# Patient Record
Sex: FEMALE | Race: WHITE
[De-identification: names, ages, dates, MRNs, and addresses within clinical notes are randomized per-mention and may not be internally consistent; named-entity substitution may affect disease eponyms.]

---

## 2018-03-20 ENCOUNTER — HOSPITAL ENCOUNTER (OUTPATIENT)
Dept: HOSPITAL 47 - WWCWWP | Age: 73
Discharge: HOME | End: 2018-03-20
Payer: MEDICARE

## 2018-03-20 VITALS — TEMPERATURE: 97.8 F | HEART RATE: 78 BPM | DIASTOLIC BLOOD PRESSURE: 61 MMHG | SYSTOLIC BLOOD PRESSURE: 135 MMHG

## 2018-03-20 VITALS — BODY MASS INDEX: 24.9 KG/M2

## 2018-03-20 DIAGNOSIS — Z12.31: Primary | ICD-10-CM

## 2018-03-20 PROCEDURE — 77067 SCR MAMMO BI INCL CAD: CPT

## 2018-03-20 PROCEDURE — 77063 BREAST TOMOSYNTHESIS BI: CPT

## 2018-03-20 NOTE — P.HPOB
History of Present Illness


H&P Date: 03/20/18


Chief Complaint: Patient is here for her routine gynecologic exam and mammogram.





This is a 72-year-old G3 PIII with an LMP of 1995.  The patient denies any 

postmenopausal bleeding.  The patient has been experiencing abdominal bloating 

and slight distention over the past year.  She is otherwise without gynecologic 

complaints.





Review of Systems


Patient is gained about 3 pounds over the last year.


She denies respiratory, cardiac and G.I. problems.  She denies maltreatment or 

problems with falling.  : she has experienced occasional leakage especially 

with coughing and sneezing.  She is not interested in any surgical correction 

at this time.





Past Medical History


Past Medical History: Asthma (Not requiring medication.), Hyperlipidemia, 

Hypertension, Musculoskeletal Disorder (Arthritis and chronic back problems.)


Additional Past Medical History / Comment(s): History of rosacea.


Past Surgical History: Cholecystectomy, Joint Replacement (Right hip replaced 

in 2012.)


Additional Past Surgical History / Comment(s): Multiple colonoscopies in the 

past in the most recent was in 2017.


Smoking Status: Never smoker


Past Alcohol Use History: None Reported


Past Drug Use History: None Reported


Additional History: She has been  since 1964 and is retired.





Medications and Allergies


 Home Medications











 Medication  Instructions  Recorded  Confirmed  Type


 


Biotin 10,000 mcg PO DAILY 03/16/18 03/20/18 History


 


Calcium Carbonate [Calcium] 600 mg PO 03/16/18  History


 


Lisinopril [Zestril] 5 mg PO DAILY 03/16/18 03/20/18 History


 


Nitrofurantoin Monohyd/M-Cryst 100 mg PO Q12HR 03/16/18 03/20/18 History





[Macrobid]    


 


Simvastatin [Zocor] 20 mg PO DAILY 03/16/18 03/20/18 History


 


Cholecalciferol (Vitamin D3) 2,000 unit PO DAILY 03/20/18 03/20/18 History





[Vitamin D3]    


 


Cyanocobalamin (Vitamin B-12) 2,000 mcg PO DAILY 03/20/18 03/20/18 History





[Vitamin B-12]    


 


Doxycycline [Vibramycin] 50 mg PO Q12HR 03/20/18 03/20/18 History


 


Glucosam/Yaya-Msm1/C/Geraldo/Bosw 1 each PO 03/20/18  History





[Glucosamine-Chondroitin Tablet]    


 


Omega-3 Fatty Acids [Omega-3] 1,000 mg PO DAILY 03/20/18 03/20/18 History














Exam





- Vital Signs


Vital signs: 


 Vital Signs











  Temp Pulse BP


 


 03/20/18 13:48  97.8 F  78  135/61








 Intake and Output











 03/19/18 03/20/18 03/20/18





 22:59 06:59 14:59


 


Other:   


 


  Weight   74.389 kg














Height 5'8", BMI 25.





This is a well-developed well-nourished white female who is alert and oriented 

times 3 in no acute distress.


HEENT: Within normal limits.


NECK: Supple without mass or thyromegaly.


CHEST AND LUNGS: Clear to auscultation.


HEART: Regular rate and rhythm.


BREASTS: Are without mass or discharge.


AXILLARY EXAM: Negative for adenopathy.


BACK: Negative for CVA tenderness.


ABDOMEN: Soft, nontender, without palpable masses.


PELVIC EXAM: Normal external genitalia with mild atrophy. Cervix and vagina 

appear normal with mild atrophy. There is no unusual discharge.  There is no 

evidence of prolapse at rest.  With cough and Valsalva there is mild urethral 

mobility without urinary leakage demonstrated.  The uterus is midposition, 

nongravid size and nontender. There are no palpable adnexal masses or 

tenderness.


RECTAL EXAM: recto vaginal exam is negative for mass or tenderness and is 

negative for occult blood.


EXTREMITIES: Nontender.





IMPRESSION: 


1.  72-year-old menopausal female with normal gynecologic exam.


2.  Mild stress urinary incontinence without significant cystocele.  The 

patient is declining surgical intervention at this time.


3.  Subjective sensation of chronic abdominal bloating without any significant 

physical findings at this time.





PLAN: 


1.  Pap smear was deferred since she had one done less than 2 years ago which 

was normal.


2.  Self breast examination was discussed.


3.  Screening mammogram will be done today.


4.  Pelvic ultrasound will be scheduled to evaluate the abdominal bloating.


5.  Patient did receive a flu shot this past fall.


6.  Kegel exercises were recommended.  She will call if she is having worsening 

symptoms.


7.  She will return in one year.

## 2018-03-21 ENCOUNTER — HOSPITAL ENCOUNTER (OUTPATIENT)
Dept: HOSPITAL 47 - RADUSWWP | Age: 73
Discharge: HOME | End: 2018-03-21
Payer: MEDICARE

## 2018-03-21 DIAGNOSIS — R14.0: Primary | ICD-10-CM

## 2018-03-21 PROCEDURE — 76830 TRANSVAGINAL US NON-OB: CPT

## 2018-03-21 PROCEDURE — 76856 US EXAM PELVIC COMPLETE: CPT

## 2018-03-22 NOTE — MM
Reason for exam: screening  (asymptomatic).

Last mammogram was performed 1 year and 3 months ago.



History:

Patient is postmenopausal.

Benign cyst aspiration of the right breast.

Took estrogen for 10 years.  Took progesterone for 10 years.



Physical Findings:

A clinical breast exam by your physician is recommended on an annual basis and 

results should be correlated with mammographic findings.



MG 3D Screening Mammo W/Cad

Bilateral CC and MLO view(s) were taken.

Prior study comparison: December 20, 2016, bilateral MG 3d screening mammo w/cad. 

November 17, 2015, bilateral MG 3d screening mammo w/cad.

The breast tissue is heterogeneously dense. This may lower the sensitivity of 

mammography.  No suspicious abnormality.  No significant changes when compared 

with prior studies.





ASSESSMENT: Negative, BI-RAD 1



RECOMMENDATION:

Routine screening mammogram of both breasts in 1 year.

## 2018-03-22 NOTE — US
EXAMINATION TYPE: US pelvis complete transvag

 

DATE OF EXAM: 3/21/2018

 

COMPARISON: NONE

 

CLINICAL HISTORY: R14.0 ABD BLOATING.

 

TECHNIQUE:  Transvaginal (TV) and Transabdominal (TA) .  Transabdominal sonographic images of the pel
vis were acquired.  Transvaginal sonographic images were medically necessary to better assess the fol
lowing anatomy: uterus and ovaries.

 

Date of LMP:  1995

 

EXAM MEASUREMENTS:

 

Uterus:  6.1 x 3.0 x 4.5 cm

Endometrial Stripe: 0.3 cm

Right Ovary:  not identified on today's exam cm

Left Ovary:  not identified on today's exam cm

 

 

 

1. Uterus:  Anteverted   wnl

2. Endometrium:  wnl

3. Right Ovary:    not identified on today's exam 

4. Left Ovary:  not identified on today's exam

5. Bilateral Adnexa:  wnl

6. Posterior cul-de-sac:  no free fluid

 

 

 

IMPRESSION: 

1. No distinct abnormality seen. Ovaries not clearly visualized. No adnexal masses.

## 2019-08-27 ENCOUNTER — HOSPITAL ENCOUNTER (OUTPATIENT)
Dept: HOSPITAL 47 - WWCWWP | Age: 74
Discharge: HOME | End: 2019-08-27
Attending: OBSTETRICS & GYNECOLOGY
Payer: MEDICARE

## 2019-08-27 VITALS
SYSTOLIC BLOOD PRESSURE: 145 MMHG | DIASTOLIC BLOOD PRESSURE: 81 MMHG | RESPIRATION RATE: 18 BRPM | HEART RATE: 85 BPM | TEMPERATURE: 97.6 F

## 2019-08-27 VITALS — BODY MASS INDEX: 23.8 KG/M2

## 2019-08-27 DIAGNOSIS — Z12.31: Primary | ICD-10-CM

## 2019-08-27 PROCEDURE — 77067 SCR MAMMO BI INCL CAD: CPT

## 2019-08-27 PROCEDURE — 77063 BREAST TOMOSYNTHESIS BI: CPT

## 2019-08-27 NOTE — P.HPOB
History of Present Illness


H&P Date: 08/27/19


Chief Complaint: The patient is here for her routine gynecologic exam and ma

mmogram.


This is a 73-year-old G3 PIII with an LMP of 1995.  The patient denies any 

postmenopausal bleeding.  The patient states she has having more urinary 

leakage.  She states it can occur with coughing and sneezing.  She also has some

urge incontinence and can leak if she does not get to the bathroom right away.








Review of Systems


She has lost 7 pounds over the last year. She denies respiratory, cardiac and 

G.I. problems.  She denies maltreatment or problems with falling.  : urinary 

leakage as per the HPI.








Past Medical History


Past Medical History: Asthma, Hyperlipidemia, Hypertension, Musculoskeletal 

Disorder


Additional Past Medical History / Comment(s): History of rosacea. PAST GYN 

HISTORY: She has no history of STDs.


History of Any Multi-Drug Resistant Organisms: None Reported


Past Surgical History: Cholecystectomy, Joint Replacement


Additional Past Surgical History / Comment(s): Right hip replaced in 2012.  

Multiple colonoscopies in the past in the most recent was in 2017.


Smoking Status: Never smoker


Past Alcohol Use History: None Reported


Past Drug Use History: None Reported


Additional History: She has been  since 1964 and is infrequently sexual 

active.  She is retired.





- Past Family History


  ** Mother


Family Medical History: Cancer


Additional Family Medical History / Comment(s): Colon cancer.





Medications and Allergies


                                Home Medications











 Medication  Instructions  Recorded  Confirmed  Type


 


Biotin 10,000 mcg PO DAILY 03/16/18 08/27/19 History


 


Calcium Carbonate [Calcium] 600 mg PO DAILY 03/16/18 08/27/19 History


 


Nitrofurantoin Monohyd/M-Cryst 100 mg PO Q12HR 03/16/18 08/27/19 History





[Macrobid]    


 


Simvastatin [Zocor] 20 mg PO DAILY 03/16/18 08/27/19 History


 


Cholecalciferol (Vitamin D3) 2,000 unit PO DAILY 03/20/18 08/27/19 History





[Vitamin D3]    


 


Cyanocobalamin (Vitamin B-12) 6,000 mcg PO DAILY 03/20/18 08/27/19 History





[Vitamin B-12]    


 


Doxycycline [Vibramycin] 50 mg PO Q12HR 03/20/18 08/27/19 History


 


Omega-3 Fatty Acids [Omega-3] 1,000 mg PO DAILY 03/20/18 08/27/19 History


 


Bacillus Coagulans/Inulin 1 each PO DAILY 08/27/19 08/27/19 History





[Probiotic with Prebiotic Cap]    


 


Multivitamin [Multivitamins Adult 1 each PO DAILY 08/27/19 08/27/19 History





Gummies]    








                                    Allergies











Allergy/AdvReac Type Severity Reaction Status Date / Time


 


Penicillins Allergy  Rash/Hives Unverified 08/27/19 09:42


 


Sulfa (Sulfonamide Allergy  Rash/Hives Unverified 08/27/19 09:42





Antibiotics)     














Exam


                                   Vital Signs











  Temp Pulse Resp BP Pulse Ox


 


 08/27/19 09:35  97.6 F  85  18  145/81  96








                                Intake and Output











 08/26/19 08/27/19 08/27/19





 22:59 06:59 14:59


 


Other:   


 


  Weight   71.214 kg











Height 5'8", weight 157 pounds, BMI 23.9.





This is a well-developed well-nourished white female who is alert and oriented 

times 3 in no acute distress.


HEENT: Within normal limits.


NECK: Supple without mass or thyromegaly.


CHEST AND LUNGS: Clear to auscultation.


HEART: Regular rate and rhythm.


BREASTS: Are without mass or discharge.


AXILLARY EXAM: Negative for adenopathy.


BACK: Negative for CVA tenderness.


ABDOMEN: Soft, nontender, without palpable masses.


PELVIC EXAM: Normal external genitalia with mild atrophy. Cervix and vagina 

appear normal is mild atrophy. There is no unusual discharge.  There is no 

evidence of prolapse.  With cough and Valsalva there is mild urethral mobility 

with no urinary leakage demonstrated.  The uterus is midposition, nongravid size

and nontender. There are no palpable adnexal masses or tenderness.


RECTAL EXAM: rectovaginal exam is negative for mass or tenderness and is 

negative for occult blood.


EXTREMITIES: Nontender.





IMPRESSION: 


1.  73-year-old menopausal female with normal gynecologic exam.


2.  Mixed urinary incontinence with mild urethral mobility.





PLAN: 


1.  Pap smear was performed.  If this is normal, consider discontinuing Pap 

smears since we will have documented adequate cervical screening with 3 negative

Pap smears in the last 10 years.  She has no history of cervical problems.


2.  Self breast awareness was discussed with the patient.


3.  Screening mammogram will be done today.


4.  Osteoporosis prevention was discussed.  I have stressed the importance of 

adequate calcium, vitamin D and regular exercise.  Recommended amounts of 

calcium and vitamin D were also discussed.  Pregnancy screening in 1 to 2 years 

since her she had a normal bone density test on 11/20/2015.


5.  She does receive flu shots and fall.


6.  I've recommended referral to a gynecologic urologist such as Dr. Pozo.  She

will think about this and let me know when she would like to have a referral for

her urinary incontinence.


7. The patient was advised to return in 1-2 years for her well woman 

examination.

## 2020-10-13 ENCOUNTER — HOSPITAL ENCOUNTER (OUTPATIENT)
Dept: HOSPITAL 47 - WWCWWP | Age: 75
Discharge: HOME | End: 2020-10-13
Attending: OBSTETRICS & GYNECOLOGY
Payer: MEDICARE

## 2020-10-13 VITALS
HEART RATE: 61 BPM | TEMPERATURE: 97.9 F | DIASTOLIC BLOOD PRESSURE: 72 MMHG | SYSTOLIC BLOOD PRESSURE: 126 MMHG | RESPIRATION RATE: 18 BRPM

## 2020-10-13 DIAGNOSIS — Z12.31: Primary | ICD-10-CM

## 2020-10-13 PROCEDURE — 77063 BREAST TOMOSYNTHESIS BI: CPT

## 2020-10-13 PROCEDURE — 77067 SCR MAMMO BI INCL CAD: CPT

## 2020-10-13 NOTE — P.HPOB
History of Present Illness


H&P Date: 10/13/20


Chief Complaint: The patient is here for her routine gynecologic exam and ma

mmogram.


This is a 75-year-old  with an LMP of .  The patient is without 

gynecologic complaints and denies any postmenopausal bleeding.  She has had 

fairly frequent urinary tract infections this past year and with urine testing 

was told there was microscopic hematuria.  She has an appointment with her 

urologist, Dr. Razo, for this in 2 days.








Review of Systems


She has gained about 7 pounds over the past year. She denies respiratory or 

cardiac problems.  GI: She has been having some upper abdominal pains with some 

food and has an appointment with a GI specialist.  She denies maltreatment or 

problems with falling.  : She continues to have some urinary leakage and this 

is not new.








Past Medical History


Past Medical History: Asthma, Hyperlipidemia, Hypertension, Musculoskeletal 

Disorder


Additional Past Medical History / Comment(s): History of rosacea. PAST GYN 

HISTORY: She has no history of STDs.


History of Any Multi-Drug Resistant Organisms: None Reported


Past Surgical History: Cholecystectomy, Joint Replacement


Additional Past Surgical History / Comment(s): Right hip replaced in .  

Multiple colonoscopies in the past in the most recent was in 2017.


Smoking Status: Never smoker


Past Alcohol Use History: None Reported


Past Drug Use History: None Reported


Additional History: She has been  since .  Her  has had some 

complications related to his diabetes and he has required a below-the-knee 

amputation.  She is retired.





- Past Family History


  ** Mother


Family Medical History: Cancer


Additional Family Medical History / Comment(s): Colon cancer.





Medications and Allergies


                                Home Medications











 Medication  Instructions  Recorded  Confirmed  Type


 


Biotin 10,000 mcg PO DAILY 03/16/18 10/13/20 History


 


Calcium Carbonate [Calcium] 600 mg PO DAILY 03/16/18 10/13/20 History


 


Nitrofurantoin Monohyd/M-Cryst 100 mg PO Q12HR 03/16/18 10/13/20 History





[Macrobid]    


 


Simvastatin [Zocor] 20 mg PO DAILY 03/16/18 10/13/20 History


 


Cholecalciferol (Vitamin D3) 2,000 unit PO DAILY 03/20/18 10/13/20 History





[Vitamin D3]    


 


Cyanocobalamin (Vitamin B-12) 6,000 mcg PO DAILY 03/20/18 10/13/20 History





[Vitamin B-12]    


 


Omega-3 Fatty Acids [Omega-3] 1,000 mg PO DAILY 03/20/18 10/13/20 History


 


Multivitamin [Multivitamins Adult 1 each PO DAILY 08/27/19 10/13/20 History





Gummies]    


 


Col-Rite 1 tab PO BID 10/13/20 10/13/20 History


 


Pantoprazole [Protonix] 40 mg PO DAILY 10/13/20 10/13/20 History








                                    Allergies











Allergy/AdvReac Type Severity Reaction Status Date / Time


 


Penicillins Allergy  Rash/Hives Unverified 10/13/20 12:53


 


Sulfa (Sulfonamide Allergy  Rash/Hives Unverified 10/13/20 12:53





Antibiotics)     














Exam


                                   Vital Signs











  Temp Pulse Resp BP Pulse Ox


 


 10/13/20 12:47  97.9 F  61  18  126/72  96








                                Intake and Output











 10/12/20 10/13/20 10/13/20





 22:59 06:59 14:59


 


Other:   


 


  Weight   74.389 kg











Height 5 feet 7-1/2 inches, weight 164 pounds, BMI 25.3.





This is a well-developed well-nourished white female who is alert and oriented 

times 3 in no acute distress.


HEENT: Within normal limits.


NECK: Supple without mass or thyromegaly.


CHEST AND LUNGS: Clear to auscultation.


HEART: Regular rate and rhythm.


BREASTS: Are without mass or discharge.


AXILLARY EXAM: Negative for adenopathy.


BACK: Negative for CVA tenderness.


ABDOMEN: Soft, nontender, without palpable masses.


PELVIC EXAM: Normal external genitalia with mild to moderate atrophy. Cervix and

 vagina appear normal with mild to moderate atrophy. There is no unusual 

discharge.  There is no evidence of prolapse.  The uterus is midposition, 

nongravid size and nontender. There are no palpable adnexal masses or 

tenderness.


RECTAL EXAM: Rectovaginal exam is negative for mass or tenderness and is 

negative for occult blood.


EXTREMITIES: Nontender.





IMPRESSION: 


1.  75-year-old menopausal female with normal gynecologic exam.


2.  History of chronic mixed urinary incontinence.


3.  Recent history of frequent urinary tract infections and microscopic 

hematuria.





PLAN: 


1.  Pap smears have been discontinued.


2.  Self breast awareness was discussed with the patient.


3.  Screening mammogram will be done today.


4.  She has an appointment with her urologist in 2 days for the microscopic 

hematuria and for urinary tract infections.  She will let me know if she wants 

referral to a GYN urologist for her urinary incontinence.


5.  Osteoporosis prevention was discussed.  I have stressed the importance of 

adequate calcium, vitamin D and regular exercise.  Recommended amounts of 

calcium and vitamin D were also discussed.  Bone density test was normal in 

.  I have recommended repeating the bone density testing.  She would like to

 do this in 1 year.


6. The patient was advised to return in 1-2 years for her well woman 

examination.

## 2020-10-14 NOTE — MM
Reason for exam: screening  (asymptomatic).

Last mammogram was performed 1 year and 2 months ago.



History:

Patient is postmenopausal.

Benign cyst aspiration of the right breast.

Took estrogen for 10 years.  Took progesterone for 10 years.



Physical Findings:

A clinical breast exam by your physician is recommended on an annual basis and 

results should be correlated with mammographic findings.



MG 3D Screening Mammo W/Cad

Bilateral CC and MLO view(s) were taken.

Prior study comparison: August 27, 2019, bilateral MG 3d screening mammo w/cad.  

March 20, 2018, bilateral MG 3d screening mammo w/cad.

The breast tissue is heterogeneously dense. This may lower the sensitivity of 

mammography.  There is no discrete abnormality.  No significant changes when 

compared with prior studies.





ASSESSMENT: Negative, BI-RAD 1



RECOMMENDATION:

Routine screening mammogram of both breasts in 1 year.

## 2020-12-04 ENCOUNTER — HOSPITAL ENCOUNTER (OUTPATIENT)
Dept: HOSPITAL 47 - ORWHC2ENDO | Age: 75
Discharge: HOME | End: 2020-12-04
Attending: INTERNAL MEDICINE
Payer: MEDICARE

## 2020-12-04 VITALS — RESPIRATION RATE: 16 BRPM | TEMPERATURE: 97.2 F

## 2020-12-04 VITALS — HEART RATE: 74 BPM | SYSTOLIC BLOOD PRESSURE: 139 MMHG | DIASTOLIC BLOOD PRESSURE: 70 MMHG

## 2020-12-04 VITALS — BODY MASS INDEX: 24.7 KG/M2

## 2020-12-04 DIAGNOSIS — N39.0: ICD-10-CM

## 2020-12-04 DIAGNOSIS — K21.9: ICD-10-CM

## 2020-12-04 DIAGNOSIS — Z88.2: ICD-10-CM

## 2020-12-04 DIAGNOSIS — E78.5: ICD-10-CM

## 2020-12-04 DIAGNOSIS — M19.90: ICD-10-CM

## 2020-12-04 DIAGNOSIS — Z87.09: ICD-10-CM

## 2020-12-04 DIAGNOSIS — Z88.0: ICD-10-CM

## 2020-12-04 DIAGNOSIS — K29.50: Primary | ICD-10-CM

## 2020-12-04 DIAGNOSIS — Z79.899: ICD-10-CM

## 2020-12-04 DIAGNOSIS — I10: ICD-10-CM

## 2020-12-04 PROCEDURE — 43239 EGD BIOPSY SINGLE/MULTIPLE: CPT

## 2020-12-04 PROCEDURE — 88305 TISSUE EXAM BY PATHOLOGIST: CPT

## 2020-12-04 RX ADMIN — POTASSIUM CHLORIDE SCH MLS: 14.9 INJECTION, SOLUTION INTRAVENOUS at 09:10

## 2020-12-04 RX ADMIN — POTASSIUM CHLORIDE SCH MLS: 14.9 INJECTION, SOLUTION INTRAVENOUS at 09:37

## 2020-12-04 NOTE — P.PCN
Date of Procedure: 12/04/20


Procedure(s) Performed: 


BRIEF HISTORY: Patient is a 75-year-old, pleasant, revealed scheduled for an 

upper endoscopy for evaluation of long-standing history of epigastric pain and 

GERD.  She been on Protonix 40 mg daily for the last 6 months 





PROCEDURE PERFORMED: Esophagogastroduodenoscopy with biopsy.





PREOPERATIVE DIAGNOSIS: Chronic epigastric pain and heartburn 


IV sedation per anesthesia. 





PROCEDURE: After informed consent was obtained, the patient  was brought into 

the endoscopy unit. IV sedation was administered by Anesthesia under continuous 

monitoring. Initially the Olympus GIF-140 video endoscope was inserted into the 

mouth. Esophagus intubated without any difficulty. It was gradually advanced 

into the stomach and duodenum and carefully examined. The bulb and the second 

part of the duodenum appeared normal. The scope at this time was withdrawn to 

the stomach, adequately insufflated with air, and upon careful examination, 

mucosa of the antrum had mild gastritis and biopsies were done from this area.  

The, body, cardia and the fundus appeared normal. The scope was then withdrawn 

into the esophagus. The GE junction was located at 39 cm from the incisors. The 

esophagus appeared normal. There were no erosions or ulcerations seen, biopsies 

were done from the distal esophageal and the patient tolerated the procedure 

well. 





IMPRESSION: 


1.  Mild antral gastritis.


2.  Normal-appearing esophagus with no evidence of esophagitis or Moses's 

esophagus.





RECOMMENDATIONS: The findings of this examination were discussed with the 

patient as well as a family.  She was advised to follow with the biopsy result. 

She was advised to continue with Protonix 40 mg daily and follow antireflux 

measures..

## 2022-10-04 ENCOUNTER — HOSPITAL ENCOUNTER (OUTPATIENT)
Dept: HOSPITAL 47 - WWCWWP | Age: 77
End: 2022-10-04
Attending: OBSTETRICS & GYNECOLOGY
Payer: MEDICARE

## 2022-10-04 VITALS
DIASTOLIC BLOOD PRESSURE: 71 MMHG | RESPIRATION RATE: 17 BRPM | SYSTOLIC BLOOD PRESSURE: 132 MMHG | TEMPERATURE: 98.3 F | HEART RATE: 69 BPM

## 2022-10-04 DIAGNOSIS — Z88.2: ICD-10-CM

## 2022-10-04 DIAGNOSIS — M19.90: ICD-10-CM

## 2022-10-04 DIAGNOSIS — N39.46: ICD-10-CM

## 2022-10-04 DIAGNOSIS — I10: ICD-10-CM

## 2022-10-04 DIAGNOSIS — Z12.31: Primary | ICD-10-CM

## 2022-10-04 DIAGNOSIS — Z88.0: ICD-10-CM

## 2022-10-04 DIAGNOSIS — E78.5: ICD-10-CM

## 2022-10-04 DIAGNOSIS — K21.9: ICD-10-CM

## 2022-10-04 DIAGNOSIS — J45.909: ICD-10-CM

## 2022-10-04 PROCEDURE — 77067 SCR MAMMO BI INCL CAD: CPT

## 2022-10-04 PROCEDURE — 77063 BREAST TOMOSYNTHESIS BI: CPT

## 2022-10-04 NOTE — P.HPOB
History of Present Illness


H&P Date: 10/04/22


Chief Complaint: The patient is here for her routine gynecologic exam and ma

mmogram.





This is a 77-year-old  with an LMP of .  The patient is without 

gynecologic complaints.  She continues to have some issues with urinary leakage.

 She can notice urinary leakage with walking, standing, coughing, and sneezing. 

He states the leakage is immediate.  She knows she may be a candidate for 

surgical correction for her incontinence, however, she states she is too busy at

this time since she does spend much time caring for her .





Review of Systems





The patient's weight has been stable over the last year.  She denies 

respiratory, cardiac, or G.I. problems.  : Urinary leakage as in the HPI.





Past Medical History


Past Medical History: Asthma, GERD/Reflux, Hyperlipidemia, Hypertension, 

Osteoarthritis (OA), Skin Disorder


Additional Past Medical History / Comment(s): rosacea.  Mixed urinary 

incontinence.  PAST GYN HISTORY: She has no history of STDs.


History of Any Multi-Drug Resistant Organisms: None Reported, ESBL


Date of last positivie culture/infection: 20 ESBL E.coli


MDRO Source:: Urine


Past Surgical History: Cholecystectomy, Joint Replacement, Tonsillectomy


Additional Past Surgical History / Comment(s): Right hip replacement, jyothi 

cataracts.  Last colonoscopy .


Past Anesthesia/Blood Transfusion Reactions: No Reported Reaction


Past Psychological History: No Psychological Hx Reported


Smoking Status: Never smoker


Past Alcohol Use History: None Reported


Past Drug Use History: None Reported


Additional History: She has been  since .  Her  has 

complications related to his diabetes which did require a below the knee 

amputation.  She is retired.





- Past Family History


  ** Mother


Family Medical History: Cancer


Additional Family Medical History / Comment(s): Colon cancer.





Medications and Allergies


                                Home Medications











 Medication  Instructions  Recorded  Confirmed  Type


 


Biotin [Biotin Disolve] 10,000 mcg PO DAILY 03/16/18 10/04/22 History


 


Nitrofurantoin Monohyd/M-Cryst 100 mg PO Q12HR PRN 03/16/18 10/04/22 History





[Macrobid]    


 


Simvastatin [Zocor] 20 mg PO HS 03/16/18 10/04/22 History


 


Cholecalciferol (Vitamin D3) 50 mcg PO DAILY 03/20/18 10/04/22 History





[Vitamin D3]    


 


Omega-3 Fatty Acids [Omega-3] 690 mg PO BID 03/20/18 10/04/22 History


 


Multivitamin [Multivitamins Adult 1 each PO DAILY 08/27/19 10/04/22 History





Gummies]    


 


Col-Rite 1 tab PO BID 10/13/20 10/04/22 History


 


Ascorbic Acid [Vitamin C] 500 mg PO DAILY 11/03/20 10/04/22 History


 


Cyanocobalamin (Vitamin B-12) 2,500 mcg PO DAILY 11/03/20 10/04/22 History





[Vitamin B-12]    








                                    Allergies











Allergy/AdvReac Type Severity Reaction Status Date / Time


 


Penicillins Allergy  Rash/Hives Unverified 10/04/22 12:41


 


Sulfa (Sulfonamide Allergy  Rash/Hives Unverified 10/04/22 12:41





Antibiotics)     














Exam


                                   Vital Signs











  Temp Pulse Resp BP Pulse Ox


 


 10/04/22 12:48  98.3 F  69  17  132/71  99








                                Intake and Output











 10/03/22 10/04/22 10/04/22





 22:59 06:59 14:59


 


Other:   


 


  Weight   73.482 kg














Height 5 feet 8 inches, weight 162 pounds, BMI 24.6.





This is a well-developed well-nourished white female who is alert and oriented 

times 3 in no acute distress.


HEENT: Within normal limits.


NECK: Supple without mass or thyromegaly.


CHEST AND LUNGS: Clear to auscultation.


HEART: Regular rate and rhythm.


BREASTS: Are without mass or discharge.


AXILLARY EXAM: Negative for adenopathy.


BACK: Negative for CVA tenderness.


ABDOMEN: Soft, nontender, without palpable masses.


PELVIC EXAM: Normal external genitalia with mild to moderate atrophy. Cervix and

vagina appear normal with mild to moderate atrophy. There is no unusual 

discharge.  There is no evidence of prolapse at rest.  With cough and Valsalva 

there is mild urethral mobility.  No urinary leakage was demonstrated.  The 

uterus is midposition, nongravid size and nontender. There are no palpable 

adnexal masses or tenderness.


RECTAL EXAM: Rectovaginal exam is negative for mass or tenderness and is 

negative for occult blood.


EXTREMITIES: Nontender.





IMPRESSION: 


1.  77-year-old menopausal female with normal gynecologic exam.


2.  History of chronic mixed urinary incontinence with urethral mobility, but no

significant cystocele.





PLAN: 


1.  Pap smears have been discontinued.


2.  Self breast awareness was discussed with the patient.  We have also 

discussed symptoms associated with inflammatory breast cancer.


3.  Screening mammogram will be done today.


4.  Osteoporosis prevention was discussed.  I have stressed the importance of 

adequate calcium, vitamin D and regular exercise.  Recommended amounts of 

calcium and vitamin D were also discussed.  Her last bone density test was norm

al in .  I have recommended repeating the bone density test.  The order slip

will be mailed to the patient.


5.  We have had a long discussion regarding her urinary incontinence.  I have 

recommended regular Kegal exercises.  We also have discussed a possible referral

to a uro-gynecologist.  She will let me know if she wants to proceed with this 

referral.


6.  She has completed her Covid vaccination series and did receive one booster. 

I have recommended that she look into having another booster.


7.  She is due for a colonoscopy.  She will discuss this with her PCP.


8. She was advised to return in one year for her annual well woman exam.

## 2022-10-05 NOTE — MM
Reason for Exam: Screening  (asymptomatic). 

Last mammogram was performed 2 year(s) and 0 month(s) ago. 





Patient History: 

Menarche at age 14. First Full-Term Pregnancy at age 22. Postmenopausal. Patient used Estrogen for

10 years. Patient used Progesterone for 10 years. Benign Cyst Aspiration on the right side. 





Risk Values: 

Margie 5 year model risk: 1.4%.

NCI Lifetime model risk: 2.3%.





Prior Study Comparison: 

3/20/2018 Bilateral Screening Mammogram, [facility]. 8/27/2019 Bilateral Screening Mammogram, [facility].

10/13/2020 Bilateral Screening Mammogram, [facility]. 





Tissue Density: 

There are scattered fibroglandular densities.





Findings: 

Analyzed By CAD. 

There is no suspicious group of microcalcifications or new suspicious mass in either breast. 





Overall Assessment: Negative, BI-RAD 1





Management: 

Screening Mammogram of both breasts in 1 year.

A clinical breast exam by your physician is recommended on an annual basis and results should be

correlated with mammographic findings.  [facility] will attempt to contact patient to

return for supplemental views and ultrasound if indicated.



Electronically signed and approved by: Jeferson Woodruff DO

## 2024-11-30 ENCOUNTER — HOSPITAL ENCOUNTER (OUTPATIENT)
Dept: HOSPITAL 47 - RADMRIMAIN | Age: 79
Discharge: HOME | End: 2024-11-30
Attending: PSYCHIATRY & NEUROLOGY
Payer: MEDICARE

## 2024-11-30 DIAGNOSIS — M47.816: ICD-10-CM

## 2024-11-30 DIAGNOSIS — M51.360: Primary | ICD-10-CM

## 2024-11-30 PROCEDURE — 72148 MRI LUMBAR SPINE W/O DYE: CPT

## 2025-04-17 ENCOUNTER — HOSPITAL ENCOUNTER (OUTPATIENT)
Dept: HOSPITAL 47 - LABWHC1 | Age: 80
Discharge: HOME | End: 2025-04-17
Attending: PHYSICAL MEDICINE & REHABILITATION
Payer: MEDICARE

## 2025-04-17 DIAGNOSIS — R20.2: Primary | ICD-10-CM

## 2025-04-17 LAB
ALBUMIN SERPL-MCNC: 4.4 G/DL (ref 3.8–4.9)
ALBUMIN/GLOB SERPL: 1.91 RATIO (ref 1.6–3.17)
ALP SERPL-CCNC: 81 U/L (ref 41–126)
ALT SERPL-CCNC: 30 U/L (ref 8–44)
AST SERPL-CCNC: 25 U/L (ref 13–35)
BASOPHILS NFR BLD AUTO: 1.3 %
BUN SERPL-SCNC: 22.5 MG/DL (ref 9–27)
CALCIUM SPEC-MCNC: 9.7 MG/DL (ref 8.7–10.3)
CHLORIDE SERPL-SCNC: 106 MMOL/L (ref 96–109)
CO2 SERPL-SCNC: 26.1 MMOL/L (ref 21.6–31.8)
EOSINOPHIL # BLD AUTO: 0.11 X 10*3/UL (ref 0.04–0.35)
EOSINOPHIL NFR BLD AUTO: 1.4 %
ERYTHROCYTE [DISTWIDTH] IN BLOOD BY AUTOMATED COUNT: 4.83 X 10*6/UL (ref 4.1–5.2)
ERYTHROCYTE [DISTWIDTH] IN BLOOD: 13.5 % (ref 11.5–14.5)
GLOBULIN SER CALC-MCNC: 2.3 G/DL (ref 1.6–3.3)
GLUCOSE SERPL-MCNC: 94 MG/DL (ref 70–110)
HCT VFR BLD AUTO: 46.9 % (ref 37.2–46.3)
HGB BLD-MCNC: 14.8 G/DL (ref 12–15)
LYMPHOCYTES # SPEC AUTO: 1.63 X 10*3/UL (ref 0.9–5)
LYMPHOCYTES NFR SPEC AUTO: 21.5 %
MCH RBC QN AUTO: 30.6 PG (ref 27–32)
MCHC RBC AUTO-ENTMCNC: 31.6 G/DL (ref 32–37)
MCV RBC AUTO: 97.1 FL (ref 80–97)
MONOCYTES # BLD AUTO: 0.69 X 10*3/UL (ref 0.2–1)
MONOCYTES NFR BLD AUTO: 9.1 %
NEUTROPHILS # BLD AUTO: 5.03 X 10*3/UL (ref 1.8–7.7)
NEUTROPHILS NFR BLD AUTO: 66.3 %
NRBC BLD AUTO-RTO: 0 X 10*3/UL (ref 0–0.01)
PLATELET # BLD AUTO: 235 X 10*3/UL (ref 140–440)
POTASSIUM SERPL-SCNC: 4.6 MMOL/L (ref 3.5–5.5)
PROT SERPL-MCNC: 6.7 G/DL (ref 6.2–8.2)
SODIUM SERPL-SCNC: 144 MMOL/L (ref 135–145)
T4 FREE SERPL-MCNC: 1.03 NG/DL (ref 0.8–1.8)
WBC # BLD AUTO: 7.59 X 10*3/UL (ref 4.5–10)

## 2025-04-17 PROCEDURE — 84443 ASSAY THYROID STIM HORMONE: CPT

## 2025-04-17 PROCEDURE — 84439 ASSAY OF FREE THYROXINE: CPT

## 2025-04-17 PROCEDURE — 36415 COLL VENOUS BLD VENIPUNCTURE: CPT

## 2025-04-17 PROCEDURE — 82607 VITAMIN B-12: CPT

## 2025-04-17 PROCEDURE — 85025 COMPLETE CBC W/AUTO DIFF WBC: CPT

## 2025-04-17 PROCEDURE — 80053 COMPREHEN METABOLIC PANEL: CPT

## 2025-04-17 PROCEDURE — 83036 HEMOGLOBIN GLYCOSYLATED A1C: CPT

## 2025-04-17 PROCEDURE — 82306 VITAMIN D 25 HYDROXY: CPT
